# Patient Record
Sex: FEMALE | Race: WHITE | NOT HISPANIC OR LATINO | URBAN - NONMETROPOLITAN AREA
[De-identification: names, ages, dates, MRNs, and addresses within clinical notes are randomized per-mention and may not be internally consistent; named-entity substitution may affect disease eponyms.]

---

## 2022-11-04 ENCOUNTER — OFFICE (OUTPATIENT)
Dept: URBAN - NONMETROPOLITAN AREA CLINIC 5 | Facility: CLINIC | Age: 22
End: 2022-11-04

## 2022-11-04 VITALS
HEART RATE: 77 BPM | RESPIRATION RATE: 20 BRPM | SYSTOLIC BLOOD PRESSURE: 134 MMHG | HEIGHT: 67 IN | DIASTOLIC BLOOD PRESSURE: 83 MMHG | WEIGHT: 150 LBS

## 2022-11-04 DIAGNOSIS — R11.2 NAUSEA WITH VOMITING, UNSPECIFIED: ICD-10-CM

## 2022-11-04 DIAGNOSIS — R19.7 DIARRHEA, UNSPECIFIED: ICD-10-CM

## 2022-11-04 DIAGNOSIS — R10.84 GENERALIZED ABDOMINAL PAIN: ICD-10-CM

## 2022-11-04 PROCEDURE — 99204 OFFICE O/P NEW MOD 45 MIN: CPT | Performed by: INTERNAL MEDICINE

## 2022-11-04 NOTE — SERVICENOTES
Given patient's episodes of intractable nausea and vomiting will plan to obtain a gastric emptying scan as above to rule out gastroparesis contributing.  Will also obtain blood work as outlined above.  Will plan for EGD with small bowel and gastric biopsies.  If all is unremarkable would treat as cyclical vomiting syndrome.  If with evidence of gastroparesis with plan to start on Reglan after obtain EKG.

## 2022-11-04 NOTE — SERVICEHPINOTES
Minerva Church   is a   21 yo  female   with a past medical history of anemia, asthma, anxiety, and GERD who presents to establish care for episodes of intractable nausea and vomiting and heartburn.  Patient reports for 3 years now if she does not eat or iif she is feeling hungry she can start to vomit.  This can progress to the point where she is vomiting only bile and has to go to the ER to get IV fluids to stop the episode.   Given persistence of her symptoms she did undergo evaluation of her gallbladder a couple years ago at which time it was found to be functioning at 11% and she ultimately underwent a cholecystectomy.  She did well for several months after the surgery before her symptoms returned.  She has continued to have recurrent episodes.  She was recently seen in the ER twice in one week for symptoms.  In review she had blood work on 10/21 which revealed WBC 9.9, hemoglobin 14.6, platelets 348, sodium 133, potassium 3.3, chloride 103, CO2 22, BUN 8, creatinine 0.98, T bili 0.4, AST 23, ALT 43, alk-phos 53, total protein 7.9, albumin 4.0.  She had a CT abdomen pelvis at that time which was unremarkable.  She takes Zofran at home but does not think this helps.  When she goes to the ER she is given Compazine and Phenergan which aborts her symptoms.  She denies smoking.  She infrequently drinks alcohol.  She has previously used marijuana but denies being a chronic user.  She has never had a gastric emptying scan or an EGD.  She has not had any blood work to assess her thyroid function.

## 2022-12-16 ENCOUNTER — AMBULATORY SURGICAL CENTER (OUTPATIENT)
Dept: URBAN - NONMETROPOLITAN AREA SURGERY 4 | Facility: SURGERY | Age: 22
End: 2022-12-16
Payer: COMMERCIAL

## 2022-12-16 ENCOUNTER — OFFICE (OUTPATIENT)
Dept: URBAN - NONMETROPOLITAN AREA CLINIC 5 | Facility: CLINIC | Age: 22
End: 2022-12-16
Payer: COMMERCIAL

## 2022-12-16 VITALS
DIASTOLIC BLOOD PRESSURE: 77 MMHG | RESPIRATION RATE: 12 BRPM | OXYGEN SATURATION: 99 % | WEIGHT: 153 LBS | OXYGEN SATURATION: 100 % | SYSTOLIC BLOOD PRESSURE: 116 MMHG | HEART RATE: 90 BPM | DIASTOLIC BLOOD PRESSURE: 82 MMHG | DIASTOLIC BLOOD PRESSURE: 97 MMHG | DIASTOLIC BLOOD PRESSURE: 87 MMHG | DIASTOLIC BLOOD PRESSURE: 87 MMHG | SYSTOLIC BLOOD PRESSURE: 125 MMHG | TEMPERATURE: 99 F | HEART RATE: 68 BPM | DIASTOLIC BLOOD PRESSURE: 83 MMHG | WEIGHT: 153 LBS | TEMPERATURE: 99 F | TEMPERATURE: 98.3 F | RESPIRATION RATE: 15 BRPM | RESPIRATION RATE: 14 BRPM | HEART RATE: 69 BPM | OXYGEN SATURATION: 99 % | SYSTOLIC BLOOD PRESSURE: 117 MMHG | DIASTOLIC BLOOD PRESSURE: 79 MMHG | OXYGEN SATURATION: 97 % | HEART RATE: 78 BPM | SYSTOLIC BLOOD PRESSURE: 115 MMHG | SYSTOLIC BLOOD PRESSURE: 120 MMHG | SYSTOLIC BLOOD PRESSURE: 115 MMHG | HEART RATE: 78 BPM | OXYGEN SATURATION: 97 % | HEART RATE: 96 BPM | OXYGEN SATURATION: 97 % | RESPIRATION RATE: 14 BRPM | RESPIRATION RATE: 17 BRPM | RESPIRATION RATE: 15 BRPM | HEART RATE: 96 BPM | SYSTOLIC BLOOD PRESSURE: 123 MMHG | SYSTOLIC BLOOD PRESSURE: 125 MMHG | DIASTOLIC BLOOD PRESSURE: 97 MMHG | SYSTOLIC BLOOD PRESSURE: 135 MMHG | OXYGEN SATURATION: 94 % | SYSTOLIC BLOOD PRESSURE: 116 MMHG | HEART RATE: 105 BPM | RESPIRATION RATE: 18 BRPM | HEART RATE: 68 BPM | SYSTOLIC BLOOD PRESSURE: 125 MMHG | SYSTOLIC BLOOD PRESSURE: 123 MMHG | SYSTOLIC BLOOD PRESSURE: 117 MMHG | RESPIRATION RATE: 14 BRPM | DIASTOLIC BLOOD PRESSURE: 82 MMHG | OXYGEN SATURATION: 94 % | HEART RATE: 96 BPM | OXYGEN SATURATION: 94 % | SYSTOLIC BLOOD PRESSURE: 123 MMHG | SYSTOLIC BLOOD PRESSURE: 120 MMHG | TEMPERATURE: 98.3 F | HEART RATE: 90 BPM | WEIGHT: 153 LBS | DIASTOLIC BLOOD PRESSURE: 77 MMHG | DIASTOLIC BLOOD PRESSURE: 82 MMHG | SYSTOLIC BLOOD PRESSURE: 117 MMHG | SYSTOLIC BLOOD PRESSURE: 120 MMHG | DIASTOLIC BLOOD PRESSURE: 83 MMHG | HEART RATE: 105 BPM | OXYGEN SATURATION: 98 % | DIASTOLIC BLOOD PRESSURE: 75 MMHG | HEART RATE: 103 BPM | HEART RATE: 103 BPM | SYSTOLIC BLOOD PRESSURE: 135 MMHG | HEART RATE: 69 BPM | DIASTOLIC BLOOD PRESSURE: 97 MMHG | RESPIRATION RATE: 18 BRPM | RESPIRATION RATE: 12 BRPM | RESPIRATION RATE: 17 BRPM | SYSTOLIC BLOOD PRESSURE: 116 MMHG | HEIGHT: 67 IN | DIASTOLIC BLOOD PRESSURE: 79 MMHG | OXYGEN SATURATION: 100 % | OXYGEN SATURATION: 98 % | HEIGHT: 67 IN | OXYGEN SATURATION: 100 % | HEART RATE: 105 BPM | DIASTOLIC BLOOD PRESSURE: 75 MMHG | RESPIRATION RATE: 12 BRPM | HEART RATE: 90 BPM | RESPIRATION RATE: 18 BRPM | HEIGHT: 67 IN | HEART RATE: 69 BPM | DIASTOLIC BLOOD PRESSURE: 75 MMHG | TEMPERATURE: 98.3 F | DIASTOLIC BLOOD PRESSURE: 83 MMHG | SYSTOLIC BLOOD PRESSURE: 135 MMHG | OXYGEN SATURATION: 99 % | HEART RATE: 103 BPM | SYSTOLIC BLOOD PRESSURE: 115 MMHG | OXYGEN SATURATION: 98 % | DIASTOLIC BLOOD PRESSURE: 79 MMHG | TEMPERATURE: 99 F | HEART RATE: 78 BPM | RESPIRATION RATE: 15 BRPM | DIASTOLIC BLOOD PRESSURE: 77 MMHG | DIASTOLIC BLOOD PRESSURE: 87 MMHG | HEART RATE: 68 BPM | RESPIRATION RATE: 17 BRPM

## 2022-12-16 DIAGNOSIS — K31.89 OTHER DISEASES OF STOMACH AND DUODENUM: ICD-10-CM

## 2022-12-16 DIAGNOSIS — K44.9 DIAPHRAGMATIC HERNIA WITHOUT OBSTRUCTION OR GANGRENE: ICD-10-CM

## 2022-12-16 DIAGNOSIS — I89.0 LYMPHEDEMA, NOT ELSEWHERE CLASSIFIED: ICD-10-CM

## 2022-12-16 DIAGNOSIS — R10.84 GENERALIZED ABDOMINAL PAIN: ICD-10-CM

## 2022-12-16 DIAGNOSIS — Z79.899 OTHER LONG TERM (CURRENT) DRUG THERAPY: ICD-10-CM

## 2022-12-16 DIAGNOSIS — R11.2 NAUSEA WITH VOMITING, UNSPECIFIED: ICD-10-CM

## 2022-12-16 DIAGNOSIS — K21.9 GASTRO-ESOPHAGEAL REFLUX DISEASE WITHOUT ESOPHAGITIS: ICD-10-CM

## 2022-12-16 DIAGNOSIS — F41.9 ANXIETY DISORDER, UNSPECIFIED: ICD-10-CM

## 2022-12-16 DIAGNOSIS — J45.990 EXERCISE INDUCED BRONCHOSPASM: ICD-10-CM

## 2022-12-16 PROCEDURE — 88342 IMHCHEM/IMCYTCHM 1ST ANTB: CPT | Performed by: INTERNAL MEDICINE

## 2022-12-16 PROCEDURE — 00731 ANES UPR GI NDSC PX NOS: CPT | Mod: GA,P2,QZ | Performed by: NURSE ANESTHETIST, CERTIFIED REGISTERED

## 2022-12-16 PROCEDURE — 88305 TISSUE EXAM BY PATHOLOGIST: CPT | Performed by: INTERNAL MEDICINE

## 2022-12-16 PROCEDURE — 43239 EGD BIOPSY SINGLE/MULTIPLE: CPT | Performed by: INTERNAL MEDICINE

## 2022-12-16 PROCEDURE — 43239 EGD BIOPSY SINGLE/MULTIPLE: CPT | Mod: SG | Performed by: INTERNAL MEDICINE

## 2022-12-16 PROCEDURE — 88341 IMHCHEM/IMCYTCHM EA ADD ANTB: CPT | Performed by: INTERNAL MEDICINE

## 2022-12-16 PROCEDURE — 88313 SPECIAL STAINS GROUP 2: CPT | Performed by: INTERNAL MEDICINE

## 2022-12-16 PROCEDURE — 00731 ANES UPR GI NDSC PX NOS: CPT | Mod: QZ,P2,GA | Performed by: NURSE ANESTHETIST, CERTIFIED REGISTERED

## 2022-12-16 RX ORDER — OMEPRAZOLE 40 MG/1
CAPSULE, DELAYED RELEASE ORAL
Qty: 30 | Refills: 11 | Status: ACTIVE
Start: 2022-12-16

## 2022-12-16 RX ORDER — PANTOPRAZOLE SODIUM 40 MG/1
40 TABLET, DELAYED RELEASE ORAL
Refills: 0 | Status: COMPLETED
End: 2022-12-16

## 2023-01-09 ENCOUNTER — OFFICE (OUTPATIENT)
Dept: URBAN - NONMETROPOLITAN AREA CLINIC 5 | Facility: CLINIC | Age: 23
End: 2023-01-09

## 2023-01-09 VITALS
RESPIRATION RATE: 18 BRPM | HEIGHT: 67 IN | WEIGHT: 162 LBS | HEART RATE: 84 BPM | DIASTOLIC BLOOD PRESSURE: 72 MMHG | SYSTOLIC BLOOD PRESSURE: 118 MMHG

## 2023-01-09 DIAGNOSIS — R11.2 NAUSEA WITH VOMITING, UNSPECIFIED: ICD-10-CM

## 2023-01-09 DIAGNOSIS — K31.84 GASTROPARESIS: ICD-10-CM

## 2023-01-09 DIAGNOSIS — R10.84 GENERALIZED ABDOMINAL PAIN: ICD-10-CM

## 2023-01-09 PROCEDURE — 99214 OFFICE O/P EST MOD 30 MIN: CPT | Performed by: INTERNAL MEDICINE

## 2023-01-09 NOTE — SERVICENOTES
Counseled patient if her nausea and vomiting returns would start with a trial of Reglan.  She can also attempt to taper off the omeprazole in 1-2 months.  She can utilize Pepcid for breakthrough symptoms.  She will contact us if issues arise and will plan to see back in clinic.

## 2023-01-09 NOTE — SERVICEHPINOTES
Minerva Church   is a   22   year old  female  with a past medical history of anemia, asthma, anxiety, and GERD who presents to for follow up of episodes of intractable nausea and vomiting and heartburn.  Patient at initial visit reported for 3 years if she does not eat or if she is feeling hungry she can start to vomit.  This can progress to the point where she is vomiting only bile and has to go to the ER to get IV fluids to stop the episode.   Given persistence of her symptoms she did undergo evaluation of her gallbladder a couple years ago at which time it was found to be functioning at 11% and she ultimately underwent a cholecystectomy.  She did well for several months after the surgery before her symptoms returned.  She has continued to have recurrent episodes.  She was recently seen in the ER twice in one week for symptoms.  In review she had blood work on 10/21/2022 which revealed WBC 9.9, hemoglobin 14.6, platelets 348, sodium 133, potassium 3.3, chloride 103, CO2 22, BUN 8, creatinine 0.98, T bili 0.4, AST 23, ALT 43, alk-phos 53, total protein 7.9, albumin 4.0.  She had a CT abdomen pelvis at that time which was unremarkable.  She takes Zofran at home but does not think this helps.  When she goes to the ER she is given Compazine and Phenergan which aborts her symptoms.  
br
brPatient after initial visit completed blood work on 11/11/2022 which showed hemoglobin A1c 5%, normal a.m. cortisol, normal TSH and free T4. She also had a gastric emptying scan that revealed approximately 49% of the original bolus was identified retained within the gastric lumen at 90 minutes. She was borderline abnormal. Given this EKG was obtained which showed normal QTC. She was prescribed Reglan 10 mg t.i.d.. She also had an EGD on 12/16/2022 which noted small esophageal hiatal hernia, normal mucosa of the esophagus, stomach, duodenum. Patient at that time reported her nausea and vomiting had resolved. She did report significant acid reflux and was restarted on omeprazole 40 mg daily to be taken 30-45 minutes prior to breakfast. She presents for follow-up today. She reports she has been doing well. Her nausea and vomiting have resolved. She continues on the omeprazole. She infrequently has breakthrough symptoms and takes Mylanta. She reports overall she has significantly improved. She denies any other complaints today.